# Patient Record
Sex: MALE | Race: WHITE
[De-identification: names, ages, dates, MRNs, and addresses within clinical notes are randomized per-mention and may not be internally consistent; named-entity substitution may affect disease eponyms.]

---

## 2017-03-07 ENCOUNTER — HOSPITAL ENCOUNTER (EMERGENCY)
Dept: HOSPITAL 4 - SED | Age: 67
Discharge: HOME | End: 2017-03-07
Payer: COMMERCIAL

## 2017-03-07 VITALS
SYSTOLIC BLOOD PRESSURE: 136 MMHG | DIASTOLIC BLOOD PRESSURE: 69 MMHG | OXYGEN SATURATION: 96 % | RESPIRATION RATE: 16 BRPM | TEMPERATURE: 97.2 F | HEART RATE: 83 BPM

## 2017-03-07 VITALS — HEIGHT: 70 IN | BODY MASS INDEX: 23.77 KG/M2 | WEIGHT: 166 LBS

## 2017-03-07 DIAGNOSIS — S01.01XA: Primary | ICD-10-CM

## 2017-03-07 DIAGNOSIS — G20: ICD-10-CM
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
## 2017-03-07 DIAGNOSIS — Z88.8: ICD-10-CM

## 2017-03-07 DIAGNOSIS — Z86.73: ICD-10-CM
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
## 2017-03-07 DIAGNOSIS — E11.9: ICD-10-CM
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
## 2017-03-07 PROCEDURE — 90471 IMMUNIZATION ADMIN: CPT

## 2017-03-07 PROCEDURE — 99283 EMERGENCY DEPT VISIT LOW MDM: CPT

## 2017-03-07 PROCEDURE — 12002 RPR S/N/AX/GEN/TRNK2.6-7.5CM: CPT

## 2017-03-07 PROCEDURE — 90715 TDAP VACCINE 7 YRS/> IM: CPT

## 2017-03-07 NOTE — NUR
Patient has a 5 cm laceration to posterior head. Rama FNP applied staples 
using sterile technique.  Edges well approximated.  Site cleansed with NS. Open 
to air.  No bleeding noted.  Pt tolerated well.

## 2017-03-07 NOTE — NUR
Pt bib family c/o LAC to  back of head s/p mech fal w/o syncope. pt h/o 
parkinson's which appears moderate. pt unable to sit still, pt shuffling from 
side to side.

## 2017-03-07 NOTE — NUR
Patient given written and verbal discharge instructions and verbalizes 
understanding.  ER MD discussed with patient the results and treatment 
provided. Given copies of tests performed in ER. Patient in stable condition. 
ID arm band removed. Patient educated on wound care and to follow up with PMD. 
Pain Scale 0/10. Opportunity for questions provided and answered.

## 2017-03-09 ENCOUNTER — HOSPITAL ENCOUNTER (EMERGENCY)
Dept: HOSPITAL 4 - SED | Age: 67
Discharge: HOME | End: 2017-03-09
Payer: COMMERCIAL

## 2017-03-09 VITALS
SYSTOLIC BLOOD PRESSURE: 108 MMHG | RESPIRATION RATE: 16 BRPM | TEMPERATURE: 97.2 F | HEART RATE: 78 BPM | DIASTOLIC BLOOD PRESSURE: 55 MMHG | OXYGEN SATURATION: 97 %

## 2017-03-09 VITALS
HEART RATE: 80 BPM | SYSTOLIC BLOOD PRESSURE: 114 MMHG | RESPIRATION RATE: 16 BRPM | OXYGEN SATURATION: 97 % | DIASTOLIC BLOOD PRESSURE: 63 MMHG | TEMPERATURE: 97.7 F

## 2017-03-09 VITALS — BODY MASS INDEX: 23.77 KG/M2 | WEIGHT: 166 LBS | HEIGHT: 70 IN
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
## 2017-03-09 DIAGNOSIS — S01.91XD: Primary | ICD-10-CM

## 2017-03-09 NOTE — NUR
Patient triaged and placed in waiting room. VSS and patient appears in no acute 
distress at this time. Accompanied by WIFE, awaiting available bed, and MD 
notified of need for MSE.

## 2017-03-09 NOTE — NUR
-------------------------------------------------------------------------------

           *** Note undone in [facility] - 03/09/17 at 1505 by SDEDSTC ***            

-------------------------------------------------------------------------------

Ambulatory to hallway chair 1

## 2017-03-09 NOTE — NUR
Patient given written and verbal discharge instructions and verbalizes 
understanding.  ER MD Baldwin discussed with patient the results and treatment 
provided. Patient in stable condition. ID arm band removed.  

Patient educated on pain management and to follow up with PMD. Pain Scale 0/10.

Opportunity for questions provided and answered.

## 2017-03-09 NOTE — NUR
Patient returning to ER for laceration with staples site evaluation. A few days 
ago patient had a mechanical fall, had laceration and got staples. Site clean, 
no signs of infection.

## 2017-03-17 ENCOUNTER — HOSPITAL ENCOUNTER (EMERGENCY)
Dept: HOSPITAL 4 - SED | Age: 67
Discharge: HOME | End: 2017-03-17
Payer: COMMERCIAL

## 2017-03-17 VITALS — SYSTOLIC BLOOD PRESSURE: 145 MMHG

## 2017-03-17 VITALS — HEIGHT: 69 IN | WEIGHT: 166 LBS | BODY MASS INDEX: 24.59 KG/M2

## 2017-03-17 DIAGNOSIS — E11.9: ICD-10-CM

## 2017-03-17 DIAGNOSIS — S01.01XA: Primary | ICD-10-CM

## 2017-03-17 DIAGNOSIS — Z86.73: ICD-10-CM

## 2017-03-17 DIAGNOSIS — Z88.8: ICD-10-CM

## 2017-03-17 DIAGNOSIS — G20: ICD-10-CM
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
## 2017-03-17 DIAGNOSIS — I10: ICD-10-CM
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
## 2017-03-17 DIAGNOSIS — Z85.46: ICD-10-CM
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
## 2017-03-17 PROCEDURE — 99283 EMERGENCY DEPT VISIT LOW MDM: CPT

## 2017-03-17 PROCEDURE — 12002 RPR S/N/AX/GEN/TRNK2.6-7.5CM: CPT

## 2017-03-24 ENCOUNTER — HOSPITAL ENCOUNTER (EMERGENCY)
Dept: HOSPITAL 4 - SED | Age: 67
Discharge: HOME | End: 2017-03-24
Payer: COMMERCIAL

## 2017-03-24 VITALS — SYSTOLIC BLOOD PRESSURE: 103 MMHG

## 2017-03-24 VITALS — BODY MASS INDEX: 24.73 KG/M2 | HEIGHT: 69 IN | WEIGHT: 167 LBS

## 2017-03-24 DIAGNOSIS — E11.9: ICD-10-CM
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
## 2017-03-24 DIAGNOSIS — G20: ICD-10-CM

## 2017-03-24 DIAGNOSIS — Z88.6: ICD-10-CM

## 2017-03-24 DIAGNOSIS — S01.01XD: Primary | ICD-10-CM

## 2017-03-24 DIAGNOSIS — Z86.79: ICD-10-CM
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
## 2017-03-24 DIAGNOSIS — C61: ICD-10-CM

## 2017-03-24 DIAGNOSIS — I10: ICD-10-CM

## 2017-03-24 NOTE — NUR
Patient given written and verbal discharge instructions and verbalizes 
understanding.  ER MD discussed with patient the results and treatment 
provided. Patient in stable condition. ID arm band removed. 

No Rx given. Patient educated on pain management and to follow up with PMD. 
Pain Scale 0/10.

Opportunity for questions provided and answered.

## 2017-03-24 NOTE — NUR
3 staples removed on top of head. Skin well approximated. No redness s/s of 
infection. Pt tolerated well.

## 2017-05-14 ENCOUNTER — HOSPITAL ENCOUNTER (EMERGENCY)
Dept: HOSPITAL 4 - SED | Age: 67
Discharge: HOME | End: 2017-05-14
Payer: COMMERCIAL

## 2017-05-14 VITALS
HEART RATE: 84 BPM | TEMPERATURE: 97.5 F | RESPIRATION RATE: 18 BRPM | SYSTOLIC BLOOD PRESSURE: 135 MMHG | OXYGEN SATURATION: 98 % | DIASTOLIC BLOOD PRESSURE: 71 MMHG

## 2017-05-14 VITALS — BODY MASS INDEX: 24.73 KG/M2 | WEIGHT: 167 LBS | HEIGHT: 69 IN

## 2017-05-14 VITALS
HEART RATE: 89 BPM | OXYGEN SATURATION: 98 % | RESPIRATION RATE: 18 BRPM | DIASTOLIC BLOOD PRESSURE: 67 MMHG | TEMPERATURE: 97.5 F | SYSTOLIC BLOOD PRESSURE: 139 MMHG
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
## 2017-05-14 DIAGNOSIS — S01.01XA: Primary | ICD-10-CM

## 2017-05-14 DIAGNOSIS — I10: ICD-10-CM

## 2017-05-14 DIAGNOSIS — E11.9: ICD-10-CM

## 2017-05-14 DIAGNOSIS — Z85.46: ICD-10-CM
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
## 2017-05-14 DIAGNOSIS — Z88.6: ICD-10-CM
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
## 2018-03-02 ENCOUNTER — HOSPITAL ENCOUNTER (EMERGENCY)
Dept: HOSPITAL 4 - SED | Age: 68
LOS: 1 days | Discharge: HOME | End: 2018-03-03
Payer: COMMERCIAL

## 2018-03-02 VITALS — WEIGHT: 170 LBS | HEIGHT: 70 IN | BODY MASS INDEX: 24.34 KG/M2

## 2018-03-02 VITALS — SYSTOLIC BLOOD PRESSURE: 113 MMHG

## 2018-03-02 DIAGNOSIS — Z86.73: ICD-10-CM
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
## 2018-03-02 DIAGNOSIS — I10: ICD-10-CM

## 2018-03-02 DIAGNOSIS — G20: ICD-10-CM

## 2018-03-02 DIAGNOSIS — Z85.46: ICD-10-CM
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
## 2018-03-02 DIAGNOSIS — E11.9: ICD-10-CM

## 2018-03-02 DIAGNOSIS — S61.011A: Primary | ICD-10-CM

## 2018-03-02 DIAGNOSIS — Z88.6: ICD-10-CM

## 2018-03-02 PROCEDURE — 99283 EMERGENCY DEPT VISIT LOW MDM: CPT

## 2018-03-02 PROCEDURE — 12001 RPR S/N/AX/GEN/TRNK 2.5CM/<: CPT

## 2018-03-03 VITALS — SYSTOLIC BLOOD PRESSURE: 113 MMHG

## 2018-03-03 RX ADMIN — LIDOCAINE HYDROCHLORIDE ONE ML: 10 INJECTION, SOLUTION INFILTRATION; PERINEURAL at 00:44

## 2018-03-03 RX ADMIN — BACITRACIN ZINC ONE GM: 500 OINTMENT TOPICAL at 00:45

## 2019-01-29 ENCOUNTER — HOSPITAL ENCOUNTER (OUTPATIENT)
Dept: HOSPITAL 4 - SCT | Age: 69
Discharge: HOME | End: 2019-01-29
Payer: COMMERCIAL

## 2019-01-29 DIAGNOSIS — G31.9: Primary | ICD-10-CM

## 2019-01-29 DIAGNOSIS — I63.89: ICD-10-CM

## 2020-02-03 ENCOUNTER — HOSPITAL ENCOUNTER (EMERGENCY)
Dept: HOSPITAL 4 - SED | Age: 70
LOS: 1 days | Discharge: HOME | End: 2020-02-04
Payer: COMMERCIAL

## 2020-02-03 VITALS — WEIGHT: 167 LBS | BODY MASS INDEX: 24.73 KG/M2 | HEIGHT: 69 IN

## 2020-02-03 VITALS — SYSTOLIC BLOOD PRESSURE: 140 MMHG

## 2020-02-03 DIAGNOSIS — Z79.899: ICD-10-CM

## 2020-02-03 DIAGNOSIS — S50.812A: ICD-10-CM

## 2020-02-03 DIAGNOSIS — Z88.8: ICD-10-CM
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
## 2020-02-03 DIAGNOSIS — S01.112A: Primary | ICD-10-CM

## 2020-02-03 DIAGNOSIS — E11.9: ICD-10-CM

## 2020-02-03 DIAGNOSIS — S01.21XA: ICD-10-CM

## 2020-02-03 DIAGNOSIS — I10: ICD-10-CM
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
## 2020-02-03 NOTE — NUR
Placed in room 6  . Placed on cardiac monitor, blood pressure machine and pulse 
oximeter. To gown for exam. Side rails up.

Report given to LORENA Hall.

## 2020-02-03 NOTE — NUR
Pt from home, states that he tripped over his walker and fell onto his face 
then started bleeding. Pt AAOx4 with hx of Parkinson's Dz. Pt Denies LOC, -N/V. 
Gauze wrap to head per EMS with bright red blood to forehead area. ~1 cm lac to 
bridge of nose, no bleeding noted. Upon removal of gauze wrap, a 2 cm lac noted 
to left brow. Pressure dsg applied.

## 2020-02-04 VITALS — SYSTOLIC BLOOD PRESSURE: 146 MMHG

## 2020-02-04 NOTE — NUR
Patient given written and verbal discharge instructions and verbalizes 
understanding.  ER MD discussed with patient the results and treatment 
provided. Patient in stable condition. ID arm band removed. 

No Rx given. Patient educated on pain management and to follow up with PMD. 
Pain Scale 0.

Opportunity for questions provided and answered. Medication side effect fact 
sheet provided.

## 2020-02-04 NOTE — NUR
Patient has a 2 cm laceration to left forehead.  Dr. Ulrich applied sutures using 
sterile technique.  Edges well approximated.  Site cleansed with iodine.  
Dressing of non-adherent applied to site.  No bleeding noted.  Pt tolerated 
well.

## 2020-03-19 ENCOUNTER — HOSPITAL ENCOUNTER (EMERGENCY)
Dept: HOSPITAL 4 - SED | Age: 70
Discharge: HOME | End: 2020-03-19
Payer: COMMERCIAL

## 2020-03-19 VITALS — SYSTOLIC BLOOD PRESSURE: 166 MMHG

## 2020-03-19 VITALS — SYSTOLIC BLOOD PRESSURE: 146 MMHG | BODY MASS INDEX: 25.31 KG/M2 | HEIGHT: 68 IN | WEIGHT: 167 LBS

## 2020-03-19 DIAGNOSIS — Z79.899: ICD-10-CM

## 2020-03-19 DIAGNOSIS — S42.441A: Primary | ICD-10-CM

## 2020-03-19 DIAGNOSIS — G20: ICD-10-CM
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
## 2020-03-19 DIAGNOSIS — E11.9: ICD-10-CM

## 2020-03-19 DIAGNOSIS — Z88.6: ICD-10-CM

## 2020-03-19 DIAGNOSIS — I10: ICD-10-CM
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
## 2020-03-19 DIAGNOSIS — Z85.46: ICD-10-CM

## 2020-03-19 DIAGNOSIS — Z86.79: ICD-10-CM

## 2020-03-19 DIAGNOSIS — S00.83XA: ICD-10-CM

## 2020-03-19 LAB
ALBUMIN SERPL BCP-MCNC: 3.3 G/DL (ref 3.4–4.8)
ALT SERPL W P-5'-P-CCNC: 15 U/L (ref 12–78)
ANION GAP SERPL CALCULATED.3IONS-SCNC: 8 MMOL/L (ref 5–15)
AST SERPL W P-5'-P-CCNC: 15 U/L (ref 10–37)
BASOPHILS # BLD AUTO: 0.1 K/UL (ref 0–0.2)
BASOPHILS NFR BLD AUTO: 1 % (ref 0–2)
BILIRUB SERPL-MCNC: 0.8 MG/DL (ref 0–1)
BUN SERPL-MCNC: 17 MG/DL (ref 8–21)
CALCIUM SERPL-MCNC: 8.5 MG/DL (ref 8.4–11)
CHLORIDE SERPL-SCNC: 100 MMOL/L (ref 98–107)
CREAT SERPL-MCNC: 0.9 MG/DL (ref 0.55–1.3)
EOSINOPHIL # BLD AUTO: 0.1 K/UL (ref 0–0.4)
EOSINOPHIL NFR BLD AUTO: 0.9 % (ref 0–4)
ERYTHROCYTE [DISTWIDTH] IN BLOOD BY AUTOMATED COUNT: 14.7 % (ref 9–15)
ETHANOL SERPL-MCNC: 3 MG/DL (ref ?–10)
GFR SERPL CREATININE-BSD FRML MDRD: 108 ML/MIN (ref 90–?)
GLUCOSE SERPL-MCNC: 87 MG/DL (ref 70–99)
HCT VFR BLD AUTO: 41.4 % (ref 36–54)
HGB BLD-MCNC: 13.6 G/DL (ref 14–18)
LYMPHOCYTES # BLD AUTO: 1 K/UL (ref 1–5.5)
LYMPHOCYTES NFR BLD AUTO: 11.6 % (ref 20.5–51.5)
MCH RBC QN AUTO: 33 PG (ref 27–31)
MCHC RBC AUTO-ENTMCNC: 33 % (ref 32–36)
MCV RBC AUTO: 100 FL (ref 79–98)
MONOCYTES # BLD MANUAL: 0.5 K/UL (ref 0–1)
MONOCYTES # BLD MANUAL: 5.3 % (ref 1.7–9.3)
NEUTROPHILS # BLD AUTO: 7.2 K/UL (ref 1.8–7.7)
NEUTROPHILS NFR BLD AUTO: 81.2 % (ref 40–70)
PLATELET # BLD AUTO: 195 K/UL (ref 130–430)
POTASSIUM SERPL-SCNC: 3.5 MMOL/L (ref 3.5–5.1)
RBC # BLD AUTO: 4.14 MIL/UL (ref 4.2–6.2)
SODIUM SERPLBLD-SCNC: 139 MMOL/L (ref 136–145)
WBC # BLD AUTO: 8.8 K/UL (ref 4.8–10.8)

## 2020-03-19 PROCEDURE — 82550 ASSAY OF CK (CPK): CPT

## 2020-03-19 PROCEDURE — 72125 CT NECK SPINE W/O DYE: CPT

## 2020-03-19 PROCEDURE — G0482 DRUG TEST DEF 15-21 CLASSES: HCPCS

## 2020-03-19 PROCEDURE — 70450 CT HEAD/BRAIN W/O DYE: CPT

## 2020-03-19 PROCEDURE — 80053 COMPREHEN METABOLIC PANEL: CPT

## 2020-03-19 PROCEDURE — 99285 EMERGENCY DEPT VISIT HI MDM: CPT

## 2020-03-19 PROCEDURE — 93005 ELECTROCARDIOGRAM TRACING: CPT

## 2020-03-19 PROCEDURE — 73030 X-RAY EXAM OF SHOULDER: CPT

## 2020-03-19 PROCEDURE — 96374 THER/PROPH/DIAG INJ IV PUSH: CPT

## 2020-03-19 PROCEDURE — 36415 COLL VENOUS BLD VENIPUNCTURE: CPT

## 2020-03-19 PROCEDURE — 85025 COMPLETE CBC W/AUTO DIFF WBC: CPT

## 2020-03-19 PROCEDURE — 73060 X-RAY EXAM OF HUMERUS: CPT

## 2020-06-17 ENCOUNTER — HOSPITAL ENCOUNTER (EMERGENCY)
Dept: HOSPITAL 4 - SED | Age: 70
LOS: 1 days | Discharge: HOME | End: 2020-06-18
Payer: COMMERCIAL

## 2020-06-17 VITALS — SYSTOLIC BLOOD PRESSURE: 163 MMHG

## 2020-06-17 VITALS — WEIGHT: 165 LBS | HEIGHT: 61 IN | BODY MASS INDEX: 31.15 KG/M2

## 2020-06-17 VITALS — SYSTOLIC BLOOD PRESSURE: 149 MMHG

## 2020-06-17 DIAGNOSIS — I10: ICD-10-CM

## 2020-06-17 DIAGNOSIS — Z79.899: ICD-10-CM

## 2020-06-17 DIAGNOSIS — Z86.79: ICD-10-CM
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
## 2020-06-17 DIAGNOSIS — Z88.6: ICD-10-CM

## 2020-06-17 DIAGNOSIS — G20: ICD-10-CM
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
## 2020-06-17 DIAGNOSIS — Z85.46: ICD-10-CM

## 2020-06-17 DIAGNOSIS — E11.9: ICD-10-CM
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
## 2020-06-17 DIAGNOSIS — S06.0X1A: Primary | ICD-10-CM

## 2020-06-17 LAB
ALBUMIN SERPL BCP-MCNC: 3.4 G/DL (ref 3.4–4.8)
ALT SERPL W P-5'-P-CCNC: 10 U/L (ref 12–78)
ANION GAP SERPL CALCULATED.3IONS-SCNC: 5 MMOL/L (ref 5–15)
APPEARANCE UR: (no result)
AST SERPL W P-5'-P-CCNC: 18 U/L (ref 10–37)
BACTERIA URNS QL MICRO: (no result) /HPF
BASOPHILS # BLD AUTO: 0.1 K/UL (ref 0–0.2)
BASOPHILS NFR BLD AUTO: 0.9 % (ref 0–2)
BILIRUB SERPL-MCNC: 0.9 MG/DL (ref 0–1)
BILIRUB UR QL STRIP: NEGATIVE
BUN SERPL-MCNC: 18 MG/DL (ref 8–21)
CALCIUM SERPL-MCNC: 8.4 MG/DL (ref 8.4–11)
CHLORIDE SERPL-SCNC: 101 MMOL/L (ref 98–107)
COLOR UR: (no result)
CREAT SERPL-MCNC: 0.85 MG/DL (ref 0.55–1.3)
EOSINOPHIL # BLD AUTO: 0.1 K/UL (ref 0–0.4)
EOSINOPHIL NFR BLD AUTO: 1.2 % (ref 0–4)
ERYTHROCYTE [DISTWIDTH] IN BLOOD BY AUTOMATED COUNT: 14.5 % (ref 9–15)
ETHANOL SERPL-MCNC: < 3 MG/DL (ref ?–10)
GFR SERPL CREATININE-BSD FRML MDRD: 115 ML/MIN (ref 90–?)
GLUCOSE SERPL-MCNC: 109 MG/DL (ref 70–99)
GLUCOSE UR STRIP-MCNC: NEGATIVE MG/DL
HCT VFR BLD AUTO: 43.9 % (ref 36–54)
HGB BLD-MCNC: 14.4 G/DL (ref 14–18)
HGB UR QL STRIP: NEGATIVE
INR PPP: 1 (ref 0.8–1.2)
KETONES UR STRIP-MCNC: NEGATIVE MG/DL
LEUKOCYTE ESTERASE UR QL STRIP: (no result)
LYMPHOCYTES # BLD AUTO: 1.3 K/UL (ref 1–5.5)
LYMPHOCYTES NFR BLD AUTO: 17.4 % (ref 20.5–51.5)
MCH RBC QN AUTO: 33 PG (ref 27–31)
MCHC RBC AUTO-ENTMCNC: 33 % (ref 32–36)
MCV RBC AUTO: 99 FL (ref 79–98)
MONOCYTES # BLD MANUAL: 0.5 K/UL (ref 0–1)
MONOCYTES # BLD MANUAL: 6.7 % (ref 1.7–9.3)
MUCOUS THREADS URNS QL MICRO: (no result) /LPF
NEUTROPHILS # BLD AUTO: 5.5 K/UL (ref 1.8–7.7)
NEUTROPHILS NFR BLD AUTO: 73.8 % (ref 40–70)
NITRITE UR QL STRIP: NEGATIVE
PH UR STRIP: 7 [PH] (ref 5–8)
PLATELET # BLD AUTO: 193 K/UL (ref 130–430)
POTASSIUM SERPL-SCNC: 3.3 MMOL/L (ref 3.5–5.1)
PROT UR QL STRIP: NEGATIVE
PROTHROMBIN TIME: 10 SECS (ref 9.5–12.5)
RBC # BLD AUTO: 4.43 MIL/UL (ref 4.2–6.2)
RBC #/AREA URNS HPF: (no result) /HPF (ref 0–3)
SODIUM SERPLBLD-SCNC: 139 MMOL/L (ref 136–145)
SP GR UR STRIP: 1.02 (ref 1–1.03)
UROBILINOGEN UR STRIP-MCNC: 0.2 MG/DL (ref 0.2–1)
WBC # BLD AUTO: 7.4 K/UL (ref 4.8–10.8)
WBC #/AREA URNS HPF: (no result) /HPF (ref 0–3)

## 2020-06-17 PROCEDURE — 87086 URINE CULTURE/COLONY COUNT: CPT

## 2020-06-17 PROCEDURE — 70450 CT HEAD/BRAIN W/O DYE: CPT

## 2020-06-17 PROCEDURE — 72125 CT NECK SPINE W/O DYE: CPT

## 2020-06-17 PROCEDURE — 99285 EMERGENCY DEPT VISIT HI MDM: CPT

## 2020-06-17 PROCEDURE — G0482 DRUG TEST DEF 15-21 CLASSES: HCPCS

## 2020-06-17 PROCEDURE — 85610 PROTHROMBIN TIME: CPT

## 2020-06-17 PROCEDURE — 85025 COMPLETE CBC W/AUTO DIFF WBC: CPT

## 2020-06-17 PROCEDURE — 80307 DRUG TEST PRSMV CHEM ANLYZR: CPT

## 2020-06-17 PROCEDURE — 93005 ELECTROCARDIOGRAM TRACING: CPT

## 2020-06-17 PROCEDURE — 84484 ASSAY OF TROPONIN QUANT: CPT

## 2020-06-17 PROCEDURE — 85730 THROMBOPLASTIN TIME PARTIAL: CPT

## 2020-06-17 PROCEDURE — 80053 COMPREHEN METABOLIC PANEL: CPT

## 2020-06-17 PROCEDURE — 36415 COLL VENOUS BLD VENIPUNCTURE: CPT

## 2020-06-17 PROCEDURE — 81000 URINALYSIS NONAUTO W/SCOPE: CPT

## 2020-06-17 NOTE — NUR
Patient given written and verbal discharge instructions and verbalizes 
understanding.  ER MD discussed with patient the results and treatment 
provided. Patient in stable condition. ID arm band removed. 

Rx of Macrobid given. Patient educated on pain management and to follow up with 
PMD. Pain Scale 0/10.

Opportunity for questions provided and answered. Medication side effect fact 
sheet provided.

## 2020-06-17 NOTE — NUR
RECEIVED AND IN ROOM , CALM, ALERT, TREMORS, REQUESTING MEDS FOR PARKINSONS.

BIB EMT FROM HOME BLS FOR FALL. UNKNOWN CASE, DENIES HEAD INJURY.

PT STATES HE FALLS "ALMOST DAILY."

## 2020-06-17 NOTE — NUR
CALM, ALERT RESP UNLABORED, NO TREMORS OBSERVED, COMMUNICATES CLEARLY IN FULL 
COMPLETE SENTENCES. SKIN WARM AND DRY

## 2020-06-17 NOTE — NUR
SPOKE WITH WIFE /UPDATE PROVIDED. SHE WILL CALL BACK

PT CALM, ALERT, EASILY AROUSED, RESP UNLABORED, CLEAR EMNTATION AND SPEECH

## 2020-06-17 NOTE — NUR
Placed in room 5  . Placed on cardiac monitor, blood pressure machine and pulse 
oximeter. To gown for exam. Side rails up.

Report given to AKREN CARRILLO.

## 2020-06-18 LAB
AMPHETAMINES UR QL SCN: NEGATIVE
BARBITURATES UR QL SCN: NEGATIVE
BENZODIAZ UR QL SCN: POSITIVE
BZE UR QL SCN: NEGATIVE
CANNABINOIDS UR QL SCN: NEGATIVE
METHADONE UR-SCNC: NEGATIVE UMOL/L
METHAMPHET UR-SCNC: NEGATIVE UMOL/L
OPIATES UR QL SCN: NEGATIVE
OXYCODONE SERPL-MCNC: NEGATIVE NG/ML
PCP UR QL SCN: NEGATIVE
TRICYCLICS UR-MCNC: NEGATIVE NG/ML
URINE PROPOXYPHENE SCREEN: NEGATIVE

## 2020-06-27 ENCOUNTER — HOSPITAL ENCOUNTER (EMERGENCY)
Dept: HOSPITAL 4 - SED | Age: 70
LOS: 1 days | Discharge: HOME | End: 2020-06-28
Payer: COMMERCIAL

## 2020-06-27 VITALS — BODY MASS INDEX: 24.44 KG/M2 | WEIGHT: 165 LBS | HEIGHT: 69 IN

## 2020-06-27 VITALS — SYSTOLIC BLOOD PRESSURE: 144 MMHG

## 2020-06-27 DIAGNOSIS — Z85.46: ICD-10-CM

## 2020-06-27 DIAGNOSIS — Z88.6: ICD-10-CM

## 2020-06-27 DIAGNOSIS — E11.9: ICD-10-CM

## 2020-06-27 DIAGNOSIS — G20: ICD-10-CM

## 2020-06-27 DIAGNOSIS — Z86.79: ICD-10-CM

## 2020-06-27 DIAGNOSIS — I10: ICD-10-CM

## 2020-06-27 DIAGNOSIS — Z79.899: ICD-10-CM

## 2020-06-27 DIAGNOSIS — K59.00: Primary | ICD-10-CM

## 2020-06-27 NOTE — NUR
PT BIB BLS FROM HOME AFTER A FALL. PT REPORTS FEELING DIZZY AND WEAK IN THE 
LEGS PRIOR TO FALL. PT STATES HE HIT THE RIGHT SIDE OF HIS HEAD AND IS IN PAIN. 
PT REPORTS HAVING PAIN ALSO IN HIS RIGHT SHOULDER AND RIGHT RIBS. EMS REPORTS 
WIFE INFORMED THEM THE RIBS HURT FROM A FALL TWO WEEKS AGO. PT REPORTS HAVING 
DIARRHEA FOR THE PAST 2 DAYS. PT REPORTS PAIN 7 OUT OF 10. PT STILL PLACED IN 
AMBULANCE GURNEY. WILL CONTINUE TO MONITOR.

## 2020-06-28 VITALS — SYSTOLIC BLOOD PRESSURE: 163 MMHG

## 2020-06-28 NOTE — NUR
Patient given written and verbal discharge instructions and verbalizes 
understanding.  ER MD discussed with patient the results and treatment 
provided. Patient in stable condition. ID arm band removed.

Rx of MINERAL OIL given. Patient educated on pain management and to follow up 
with PMD. Pain Scale 0/10.

Opportunity for questions provided and answered. Medication side effect fact 
sheet provided.

## 2020-11-24 ENCOUNTER — HOSPITAL ENCOUNTER (EMERGENCY)
Dept: HOSPITAL 4 - SED | Age: 70
Discharge: HOME | End: 2020-11-24
Payer: COMMERCIAL

## 2020-11-24 VITALS — SYSTOLIC BLOOD PRESSURE: 134 MMHG

## 2020-11-24 VITALS — WEIGHT: 167 LBS | HEIGHT: 69 IN | BODY MASS INDEX: 24.73 KG/M2

## 2020-11-24 DIAGNOSIS — Z88.6: ICD-10-CM
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
## 2020-11-24 DIAGNOSIS — I10: ICD-10-CM
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
## 2020-11-24 DIAGNOSIS — Z79.899: ICD-10-CM

## 2020-11-24 DIAGNOSIS — G20: ICD-10-CM

## 2020-11-24 DIAGNOSIS — S59.812A: Primary | ICD-10-CM

## 2020-11-24 DIAGNOSIS — Z86.79: ICD-10-CM

## 2020-11-24 DIAGNOSIS — E11.9: ICD-10-CM
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
## 2020-11-24 DIAGNOSIS — Z85.46: ICD-10-CM

## 2020-11-24 DIAGNOSIS — M25.562: ICD-10-CM

## 2020-11-24 RX ADMIN — HYDROMORPHONE HYDROCHLORIDE ONE MG: 8 TABLET ORAL at 21:56

## 2021-01-21 ENCOUNTER — HOSPITAL ENCOUNTER (EMERGENCY)
Dept: HOSPITAL 4 - SED | Age: 71
LOS: 1 days | Discharge: HOME | End: 2021-01-22
Payer: COMMERCIAL

## 2021-01-21 VITALS — SYSTOLIC BLOOD PRESSURE: 173 MMHG | WEIGHT: 167 LBS | BODY MASS INDEX: 24.73 KG/M2 | HEIGHT: 69 IN
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
## 2021-01-21 DIAGNOSIS — Z79.899: ICD-10-CM

## 2021-01-21 DIAGNOSIS — E11.9: ICD-10-CM

## 2021-01-21 DIAGNOSIS — S53.492A: Primary | ICD-10-CM

## 2021-01-21 DIAGNOSIS — M25.522: ICD-10-CM
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
## 2021-01-21 DIAGNOSIS — Z88.6: ICD-10-CM

## 2021-01-21 DIAGNOSIS — I10: ICD-10-CM
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
## 2021-01-21 DIAGNOSIS — E87.6: ICD-10-CM

## 2021-01-22 VITALS — SYSTOLIC BLOOD PRESSURE: 152 MMHG

## 2021-01-22 LAB
ALBUMIN SERPL BCP-MCNC: 3.2 G/DL (ref 3.4–4.8)
ALT SERPL W P-5'-P-CCNC: 11 U/L (ref 12–78)
ANION GAP SERPL CALCULATED.3IONS-SCNC: 8 MMOL/L (ref 5–15)
APPEARANCE UR: CLEAR
AST SERPL W P-5'-P-CCNC: 15 U/L (ref 10–37)
BASOPHILS # BLD AUTO: 0 K/UL (ref 0–0.2)
BASOPHILS NFR BLD AUTO: 0.6 % (ref 0–2)
BILIRUB SERPL-MCNC: 1 MG/DL (ref 0–1)
BILIRUB UR QL STRIP: NEGATIVE
BUN SERPL-MCNC: 18 MG/DL (ref 8–21)
CALCIUM SERPL-MCNC: 8.6 MG/DL (ref 8.4–11)
CHLORIDE SERPL-SCNC: 102 MMOL/L (ref 98–107)
COLOR UR: YELLOW
CREAT SERPL-MCNC: 0.87 MG/DL (ref 0.55–1.3)
EOSINOPHIL # BLD AUTO: 0.2 K/UL (ref 0–0.4)
EOSINOPHIL NFR BLD AUTO: 2.3 % (ref 0–4)
ERYTHROCYTE [DISTWIDTH] IN BLOOD BY AUTOMATED COUNT: 14.1 % (ref 9–15)
GFR SERPL CREATININE-BSD FRML MDRD: 112 ML/MIN (ref 90–?)
GLUCOSE SERPL-MCNC: 88 MG/DL (ref 70–99)
GLUCOSE UR STRIP-MCNC: NEGATIVE MG/DL
HCT VFR BLD AUTO: 40.7 % (ref 36–54)
HGB BLD-MCNC: 13.7 G/DL (ref 14–18)
HGB UR QL STRIP: NEGATIVE
KETONES UR STRIP-MCNC: (no result) MG/DL
LEUKOCYTE ESTERASE UR QL STRIP: NEGATIVE
LYMPHOCYTES # BLD AUTO: 1.5 K/UL (ref 1–5.5)
LYMPHOCYTES NFR BLD AUTO: 20.4 % (ref 20.5–51.5)
MCH RBC QN AUTO: 33 PG (ref 27–31)
MCHC RBC AUTO-ENTMCNC: 34 % (ref 32–36)
MCV RBC AUTO: 99 FL (ref 79–98)
MONOCYTES # BLD MANUAL: 0.6 K/UL (ref 0–1)
MONOCYTES # BLD MANUAL: 7.6 % (ref 1.7–9.3)
NEUTROPHILS # BLD AUTO: 5.1 K/UL (ref 1.8–7.7)
NEUTROPHILS NFR BLD AUTO: 69.1 % (ref 40–70)
NITRITE UR QL STRIP: NEGATIVE
PH UR STRIP: 7 [PH] (ref 5–8)
PLATELET # BLD AUTO: 174 K/UL (ref 130–430)
POTASSIUM SERPL-SCNC: 3.3 MMOL/L (ref 3.5–5.1)
PROT UR QL STRIP: NEGATIVE
RBC # BLD AUTO: 4.11 MIL/UL (ref 4.2–6.2)
SODIUM SERPLBLD-SCNC: 142 MMOL/L (ref 136–145)
SP GR UR STRIP: 1.02 (ref 1–1.03)
UROBILINOGEN UR STRIP-MCNC: 0.2 MG/DL (ref 0.2–1)
WBC # BLD AUTO: 7.4 K/UL (ref 4.8–10.8)

## 2021-04-26 ENCOUNTER — HOSPITAL ENCOUNTER (EMERGENCY)
Dept: HOSPITAL 4 - SED | Age: 71
Discharge: HOME | End: 2021-04-26
Payer: COMMERCIAL

## 2021-04-26 VITALS — SYSTOLIC BLOOD PRESSURE: 149 MMHG

## 2021-04-26 VITALS — SYSTOLIC BLOOD PRESSURE: 157 MMHG

## 2021-04-26 VITALS — WEIGHT: 175 LBS | BODY MASS INDEX: 25.05 KG/M2 | HEIGHT: 70 IN
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
## 2021-04-26 DIAGNOSIS — I10: ICD-10-CM

## 2021-04-26 DIAGNOSIS — G20: ICD-10-CM
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
## 2021-04-26 DIAGNOSIS — Z88.8: ICD-10-CM

## 2021-04-26 DIAGNOSIS — Z79.899: ICD-10-CM
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
## 2021-04-26 DIAGNOSIS — E11.9: ICD-10-CM

## 2021-04-26 DIAGNOSIS — S50.02XA: Primary | ICD-10-CM
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
## 2021-08-15 ENCOUNTER — HOSPITAL ENCOUNTER (INPATIENT)
Dept: HOSPITAL 4 - SED | Age: 71
LOS: 2 days | Discharge: HOME HEALTH | DRG: 312 | End: 2021-08-17
Attending: INTERNAL MEDICINE | Admitting: INTERNAL MEDICINE
Payer: COMMERCIAL

## 2021-08-15 VITALS — SYSTOLIC BLOOD PRESSURE: 140 MMHG

## 2021-08-15 VITALS — WEIGHT: 165.25 LBS | BODY MASS INDEX: 24.48 KG/M2 | HEIGHT: 69 IN

## 2021-08-15 VITALS — SYSTOLIC BLOOD PRESSURE: 161 MMHG

## 2021-08-15 VITALS — SYSTOLIC BLOOD PRESSURE: 149 MMHG

## 2021-08-15 DIAGNOSIS — Z88.8: ICD-10-CM

## 2021-08-15 DIAGNOSIS — I10: ICD-10-CM

## 2021-08-15 DIAGNOSIS — F02.80: ICD-10-CM

## 2021-08-15 DIAGNOSIS — F41.9: ICD-10-CM

## 2021-08-15 DIAGNOSIS — F32.9: ICD-10-CM

## 2021-08-15 DIAGNOSIS — K21.9: ICD-10-CM

## 2021-08-15 DIAGNOSIS — M10.9: ICD-10-CM

## 2021-08-15 DIAGNOSIS — I95.1: Primary | ICD-10-CM

## 2021-08-15 DIAGNOSIS — Z85.46: ICD-10-CM

## 2021-08-15 DIAGNOSIS — Z99.3: ICD-10-CM

## 2021-08-15 DIAGNOSIS — I69.354: ICD-10-CM

## 2021-08-15 DIAGNOSIS — E11.8: ICD-10-CM

## 2021-08-15 DIAGNOSIS — G20: ICD-10-CM

## 2021-08-15 DIAGNOSIS — Z20.822: ICD-10-CM

## 2021-08-15 LAB
ALBUMIN SERPL BCP-MCNC: 3.4 G/DL (ref 3.4–4.8)
ALT SERPL W P-5'-P-CCNC: 15 U/L (ref 12–78)
ANION GAP SERPL CALCULATED.3IONS-SCNC: 6 MMOL/L (ref 5–15)
APPEARANCE UR: CLEAR
AST SERPL W P-5'-P-CCNC: 10 U/L (ref 10–37)
BASOPHILS # BLD AUTO: 0.1 K/UL (ref 0–0.2)
BASOPHILS NFR BLD AUTO: 0.8 % (ref 0–2)
BILIRUB DIRECT SERPL-MCNC: 0.3 MG/DL (ref 0–0.3)
BILIRUB SERPL-MCNC: 1.1 MG/DL (ref 0–1)
BILIRUB UR QL STRIP: NEGATIVE
BUN SERPL-MCNC: 16 MG/DL (ref 8–21)
CALCIUM SERPL-MCNC: 9 MG/DL (ref 8.4–11)
CHLORIDE SERPL-SCNC: 104 MMOL/L (ref 98–107)
COLOR UR: YELLOW
CREAT SERPL-MCNC: 0.83 MG/DL (ref 0.55–1.3)
EOSINOPHIL # BLD AUTO: 0.1 K/UL (ref 0–0.4)
EOSINOPHIL NFR BLD AUTO: 1.5 % (ref 0–4)
ERYTHROCYTE [DISTWIDTH] IN BLOOD BY AUTOMATED COUNT: 16.1 % (ref 9–15)
GFR SERPL CREATININE-BSD FRML MDRD: (no result) ML/MIN (ref 90–?)
GLUCOSE SERPL-MCNC: 105 MG/DL (ref 70–99)
GLUCOSE UR STRIP-MCNC: NEGATIVE MG/DL
HCT VFR BLD AUTO: 42.6 % (ref 36–54)
HGB BLD-MCNC: 14.7 G/DL (ref 14–18)
HGB UR QL STRIP: NEGATIVE
KETONES UR STRIP-MCNC: NEGATIVE MG/DL
LEUKOCYTE ESTERASE UR QL STRIP: NEGATIVE
LIPASE SERPL-CCNC: 29 U/L (ref 73–393)
LYMPHOCYTES # BLD AUTO: 1.2 K/UL (ref 1–5.5)
LYMPHOCYTES NFR BLD AUTO: 18.2 % (ref 20.5–51.5)
MCH RBC QN AUTO: 34 PG (ref 27–31)
MCHC RBC AUTO-ENTMCNC: 35 % (ref 32–36)
MCV RBC AUTO: 97 FL (ref 79–98)
MONOCYTES # BLD MANUAL: 0.5 K/UL (ref 0–1)
MONOCYTES # BLD MANUAL: 7.3 % (ref 1.7–9.3)
NEUTROPHILS # BLD AUTO: 4.8 K/UL (ref 1.8–7.7)
NEUTROPHILS NFR BLD AUTO: 72.2 % (ref 40–70)
NITRITE UR QL STRIP: NEGATIVE
PH UR STRIP: 8 [PH] (ref 5–8)
PLATELET # BLD AUTO: 191 K/UL (ref 130–430)
POTASSIUM SERPL-SCNC: 3.7 MMOL/L (ref 3.5–5.1)
PROT UR QL STRIP: NEGATIVE
RBC # BLD AUTO: 4.39 MIL/UL (ref 4.2–6.2)
SODIUM SERPLBLD-SCNC: 143 MMOL/L (ref 136–145)
SP GR UR STRIP: 1.02 (ref 1–1.03)
UROBILINOGEN UR STRIP-MCNC: 0.2 MG/DL (ref 0.2–1)
WBC # BLD AUTO: 6.6 K/UL (ref 4.8–10.8)

## 2021-08-15 PROCEDURE — G0378 HOSPITAL OBSERVATION PER HR: HCPCS

## 2021-08-15 PROCEDURE — 4A10X4Z MONITORING OF CENTRAL NERVOUS ELECTRICAL ACTIVITY, EXTERNAL APPROACH: ICD-10-PCS | Performed by: PSYCHIATRY & NEUROLOGY

## 2021-08-15 RX ADMIN — CARBIDOPA AND LEVODOPA SCH TAB: 25; 100 TABLET ORAL at 22:09

## 2021-08-15 RX ADMIN — Medication SCH EA: at 22:27

## 2021-08-15 RX ADMIN — ENTACAPONE SCH MG: 200 TABLET, FILM COATED ORAL at 21:00

## 2021-08-16 VITALS — SYSTOLIC BLOOD PRESSURE: 98 MMHG

## 2021-08-16 VITALS — SYSTOLIC BLOOD PRESSURE: 156 MMHG

## 2021-08-16 VITALS — SYSTOLIC BLOOD PRESSURE: 128 MMHG

## 2021-08-16 VITALS — SYSTOLIC BLOOD PRESSURE: 131 MMHG

## 2021-08-16 VITALS — SYSTOLIC BLOOD PRESSURE: 145 MMHG

## 2021-08-16 LAB
ANION GAP SERPL CALCULATED.3IONS-SCNC: 5 MMOL/L (ref 5–15)
BASOPHILS # BLD AUTO: 0.1 K/UL (ref 0–0.2)
BASOPHILS NFR BLD AUTO: 0.8 % (ref 0–2)
BUN SERPL-MCNC: 14 MG/DL (ref 8–21)
CALCIUM SERPL-MCNC: 8.8 MG/DL (ref 8.4–11)
CHLORIDE SERPL-SCNC: 106 MMOL/L (ref 98–107)
CHOLEST SERPL-MCNC: 139 MG/DL (ref ?–200)
CREAT SERPL-MCNC: 0.8 MG/DL (ref 0.55–1.3)
EOSINOPHIL # BLD AUTO: 0.2 K/UL (ref 0–0.4)
EOSINOPHIL NFR BLD AUTO: 2.3 % (ref 0–4)
ERYTHROCYTE [DISTWIDTH] IN BLOOD BY AUTOMATED COUNT: 16.1 % (ref 9–15)
GFR SERPL CREATININE-BSD FRML MDRD: (no result) ML/MIN (ref 90–?)
GLUCOSE SERPL-MCNC: 93 MG/DL (ref 70–99)
HCT VFR BLD AUTO: 38.4 % (ref 36–54)
HDLC SERPL-MCNC: 47 MG/DL (ref 45–?)
HGB BLD-MCNC: 12.8 G/DL (ref 14–18)
LDL CHOLESTEROL: 97 MG/DL (ref ?–100)
LYMPHOCYTES # BLD AUTO: 1.8 K/UL (ref 1–5.5)
LYMPHOCYTES NFR BLD AUTO: 25.9 % (ref 20.5–51.5)
MCH RBC QN AUTO: 33 PG (ref 27–31)
MCHC RBC AUTO-ENTMCNC: 33 % (ref 32–36)
MCV RBC AUTO: 98 FL (ref 79–98)
MONOCYTES # BLD MANUAL: 0.6 K/UL (ref 0–1)
MONOCYTES # BLD MANUAL: 8.4 % (ref 1.7–9.3)
NEUTROPHILS # BLD AUTO: 4.4 K/UL (ref 1.8–7.7)
NEUTROPHILS NFR BLD AUTO: 62.6 % (ref 40–70)
PLATELET # BLD AUTO: 169 K/UL (ref 130–430)
POTASSIUM SERPL-SCNC: 3.3 MMOL/L (ref 3.5–5.1)
RBC # BLD AUTO: 3.94 MIL/UL (ref 4.2–6.2)
SODIUM SERPLBLD-SCNC: 143 MMOL/L (ref 136–145)
T4 FREE SERPL-MCNC: 1.2 NG/DL (ref 0.8–1.5)
TRIGL SERPL-MCNC: 44 MG/DL (ref 30–150)
TSH SERPL DL<=0.05 MIU/L-ACNC: 2.06 UIU/ML (ref 0.36–3.74)
WBC # BLD AUTO: 7.1 K/UL (ref 4.8–10.8)

## 2021-08-16 RX ADMIN — ENTACAPONE SCH MG: 200 TABLET, FILM COATED ORAL at 09:30

## 2021-08-16 RX ADMIN — CARBIDOPA AND LEVODOPA SCH TAB: 25; 100 TABLET ORAL at 09:23

## 2021-08-16 RX ADMIN — Medication SCH EA: at 06:02

## 2021-08-16 RX ADMIN — Medication SCH EA: at 11:53

## 2021-08-16 RX ADMIN — PANTOPRAZOLE SODIUM SCH MG: 40 TABLET, DELAYED RELEASE ORAL at 09:23

## 2021-08-16 RX ADMIN — CARBIDOPA AND LEVODOPA SCH TAB: 25; 100 TABLET ORAL at 20:35

## 2021-08-16 RX ADMIN — ENTACAPONE SCH MG: 200 TABLET, FILM COATED ORAL at 17:30

## 2021-08-16 RX ADMIN — TRIAMTERENE AND HYDROCHLOROTHIAZIDE SCH CAP: 25; 37.5 CAPSULE ORAL at 09:30

## 2021-08-16 RX ADMIN — ALLOPURINOL SCH MG: 300 TABLET ORAL at 09:23

## 2021-08-16 RX ADMIN — Medication SCH EA: at 18:29

## 2021-08-16 RX ADMIN — CARBIDOPA AND LEVODOPA SCH TAB: 25; 100 TABLET ORAL at 17:30

## 2021-08-16 RX ADMIN — ENTACAPONE SCH MG: 200 TABLET, FILM COATED ORAL at 20:36

## 2021-08-16 RX ADMIN — Medication SCH EA: at 20:51

## 2021-08-16 RX ADMIN — CITALOPRAM SCH MG: 20 TABLET, FILM COATED ORAL at 09:23

## 2021-08-17 VITALS — SYSTOLIC BLOOD PRESSURE: 160 MMHG

## 2021-08-17 VITALS — SYSTOLIC BLOOD PRESSURE: 177 MMHG

## 2021-08-17 VITALS — SYSTOLIC BLOOD PRESSURE: 172 MMHG

## 2021-08-17 VITALS — SYSTOLIC BLOOD PRESSURE: 134 MMHG

## 2021-08-17 LAB
ANION GAP SERPL CALCULATED.3IONS-SCNC: 6 MMOL/L (ref 5–15)
BASOPHILS # BLD AUTO: 0 K/UL (ref 0–0.2)
BASOPHILS NFR BLD AUTO: 0.8 % (ref 0–2)
BUN SERPL-MCNC: 17 MG/DL (ref 8–21)
CALCIUM SERPL-MCNC: 8.7 MG/DL (ref 8.4–11)
CHLORIDE SERPL-SCNC: 106 MMOL/L (ref 98–107)
CREAT SERPL-MCNC: 0.86 MG/DL (ref 0.55–1.3)
EOSINOPHIL # BLD AUTO: 0.1 K/UL (ref 0–0.4)
EOSINOPHIL NFR BLD AUTO: 1.4 % (ref 0–4)
ERYTHROCYTE [DISTWIDTH] IN BLOOD BY AUTOMATED COUNT: 15.6 % (ref 9–15)
GFR SERPL CREATININE-BSD FRML MDRD: (no result) ML/MIN (ref 90–?)
GLUCOSE SERPL-MCNC: 108 MG/DL (ref 70–99)
HCT VFR BLD AUTO: 41.6 % (ref 36–54)
HGB BLD-MCNC: 14.1 G/DL (ref 14–18)
LYMPHOCYTES # BLD AUTO: 1 K/UL (ref 1–5.5)
LYMPHOCYTES NFR BLD AUTO: 15.1 % (ref 20.5–51.5)
MCH RBC QN AUTO: 33 PG (ref 27–31)
MCHC RBC AUTO-ENTMCNC: 34 % (ref 32–36)
MCV RBC AUTO: 97 FL (ref 79–98)
MONOCYTES # BLD MANUAL: 0.4 K/UL (ref 0–1)
MONOCYTES # BLD MANUAL: 7.1 % (ref 1.7–9.3)
NEUTROPHILS # BLD AUTO: 4.8 K/UL (ref 1.8–7.7)
NEUTROPHILS NFR BLD AUTO: 75.6 % (ref 40–70)
PLATELET # BLD AUTO: 179 K/UL (ref 130–430)
POTASSIUM SERPL-SCNC: 3.7 MMOL/L (ref 3.5–5.1)
RBC # BLD AUTO: 4.3 MIL/UL (ref 4.2–6.2)
SODIUM SERPLBLD-SCNC: 141 MMOL/L (ref 136–145)
WBC # BLD AUTO: 6.3 K/UL (ref 4.8–10.8)

## 2021-08-17 RX ADMIN — Medication SCH EA: at 12:09

## 2021-08-17 RX ADMIN — CITALOPRAM SCH MG: 20 TABLET, FILM COATED ORAL at 08:51

## 2021-08-17 RX ADMIN — Medication SCH EA: at 07:34

## 2021-08-17 RX ADMIN — ENTACAPONE SCH MG: 200 TABLET, FILM COATED ORAL at 16:39

## 2021-08-17 RX ADMIN — ENTACAPONE SCH MG: 200 TABLET, FILM COATED ORAL at 08:51

## 2021-08-17 RX ADMIN — PANTOPRAZOLE SODIUM SCH MG: 40 TABLET, DELAYED RELEASE ORAL at 08:51

## 2021-08-17 RX ADMIN — TRIAMTERENE AND HYDROCHLOROTHIAZIDE SCH CAP: 25; 37.5 CAPSULE ORAL at 12:28

## 2021-08-17 RX ADMIN — CARBIDOPA AND LEVODOPA SCH TAB: 25; 100 TABLET ORAL at 08:51

## 2021-08-17 RX ADMIN — CARBIDOPA AND LEVODOPA SCH TAB: 25; 100 TABLET ORAL at 16:31

## 2021-08-17 RX ADMIN — ALLOPURINOL SCH MG: 300 TABLET ORAL at 08:51

## 2022-01-13 NOTE — NUR
Pt moved to ER bed 4. Pt needs cardiac clearance. Pt is having a colonoscopy. Procedure is schedule for 01/24/2022. Pt is currently taking warfarin and needs to hold for 5 days prior to procedure. Please on how long to hold warfarin.    Last OV 10/28/2021 with NPBB  EKG 10/28/2021  Please advise [facility]   TY